# Patient Record
(demographics unavailable — no encounter records)

---

## 2025-01-07 NOTE — DISCUSSION/SUMMARY
[FreeTextEntry1] : follow up with cardiology  needs vaccine records for further check up  chest pain has resolved but still ahs cough  advise no cough medicine  no gym until further notice gave note for school need cardiology clearance  did rvp to check on further causes of cough exam is normal

## 2025-01-07 NOTE — HISTORY OF PRESENT ILLNESS
[de-identified] : patient has been coughing for two weeks and then chest paina dn radiate to arm seen in ed and diagnosed with heart infection [FreeTextEntry6] : no guns or smokers  Azeri is primary language mom- nails  dad - cleaning  transfer to [facility] for heart problem  and still coughing   echo and ekg done and supposedly normal   had abnormal ekg and elevated troponin and ahs follow up with cardiologist 1-2 week  tashia and covid negative at urgent care and given cough medicine   then went to ed and diagnosed myocarditis and transfer to [facility]  was admit for two days and released with cardiology follow up  no more chest pain cough is the same- coughing worse at night but some during the day no fever some uri initially two weeks now with cough urgent care once and emergency room once and then to hospital  cxr negative for pneumonia and did not start antibiotics  did not have any records this is by report from the family